# Patient Record
Sex: FEMALE | Race: WHITE | NOT HISPANIC OR LATINO | Employment: FULL TIME | URBAN - METROPOLITAN AREA
[De-identification: names, ages, dates, MRNs, and addresses within clinical notes are randomized per-mention and may not be internally consistent; named-entity substitution may affect disease eponyms.]

---

## 2024-02-27 NOTE — PROGRESS NOTES
Assessment/Plan:  Calcium 1000 mg + 600-1000 IU Vit D daily. Pap with high risk HPV Q 3 years,  Annual mammogram, monthly breast self exam.        Diagnoses and all orders for this visit:    Encounter for gynecological examination without abnormal finding    Encounter for screening mammogram for breast cancer  -     Mammo screening bilateral w 3d & cad; Future    Encounter for Papanicolaou smear for cervical cancer screening  -     IGP,CtNg,AptimaHPV,rfx16/18,45    Screen for STD (sexually transmitted disease)  -     Hepatitis C antibody; Future  -     HIV 1/2 AG/AB W REFLEX [facility] only; Future  -     RPR, Rfx Qn RPR/Confirm TP; Future  -     Hepatitis C antibody  -     HIV 1/2 AG/AB W REFLEX [facility] only  -     RPR, Rfx Qn RPR/Confirm TP  -     IGP,CtNg,AptimaHPV,rfx16/18,45    Other orders  -     LORazepam (ATIVAN) 0.5 mg tablet; TAKE 1/2 -1 TABLET BY MOUTH ONCE A DAY AS NEEDED FOR ANXIETY          Subjective:      Patient ID: Kait Rice is a 43 y.o. female.    New/former pt here for annual gyn  H/o abn pap 2017 ASCUS + HPV [place] neg 2018 LGSIL colpneg. PAP  neg/neg  neg/neg Out of relationship would like STD testing LMP Oct had spotting in Dec periods past year spacing out every 2-3 months + hot flashes night sweats Reports mom early menopause but not sure how early Has appt with primary for screening labs Has not yet had mammo         The following portions of the patient's history were reviewed and updated as appropriate: allergies, current medications, past family history, past medical history, past social history, past surgical history, and problem list.    Review of Systems   Constitutional:  Negative for fatigue and unexpected weight change.   Gastrointestinal:  Negative for abdominal distention, abdominal pain, constipation and diarrhea.   Genitourinary:  Negative for difficulty urinating, dyspareunia, dysuria, frequency, genital sores, menstrual problem, pelvic  "pain, urgency, vaginal bleeding, vaginal discharge and vaginal pain.   Neurological:  Negative for headaches.   Psychiatric/Behavioral: Negative.  Negative for dysphoric mood. The patient is not nervous/anxious.          Objective:      /78 (BP Location: Right arm, Patient Position: Sitting, Cuff Size: Standard)   Ht 5' 4\" (1.626 m)   Wt 76.2 kg (168 lb)   BMI 28.84 kg/m²          Physical Exam  Vitals and nursing note reviewed.   Constitutional:       General: She is not in acute distress.     Appearance: Normal appearance.   HENT:      Head: Normocephalic and atraumatic.   Pulmonary:      Effort: Pulmonary effort is normal.   Chest:   Breasts:     Breasts are symmetrical.      Right: Normal. No mass, nipple discharge, skin change or tenderness.      Left: Normal. No mass, nipple discharge, skin change or tenderness.   Abdominal:      General: There is no distension.      Palpations: Abdomen is soft.      Tenderness: There is no abdominal tenderness. There is no guarding or rebound.   Genitourinary:     General: Normal vulva.      Exam position: Lithotomy position.      Labia:         Right: No rash, tenderness, lesion or injury.         Left: No rash, tenderness, lesion or injury.       Urethra: No prolapse, urethral pain, urethral swelling or urethral lesion.      Vagina: Normal. No erythema or lesions.      Cervix: No cervical motion tenderness, discharge, lesion or cervical bleeding.      Uterus: Normal.       Adnexa: Right adnexa normal and left adnexa normal.        Right: No mass or tenderness.          Left: No mass or tenderness.        Rectum: No mass or external hemorrhoid.      Comments: PAP from cervix  Musculoskeletal:         General: Normal range of motion.   Lymphadenopathy:      Upper Body:      Right upper body: No axillary adenopathy.      Left upper body: No axillary adenopathy.      Lower Body: No right inguinal adenopathy. No left inguinal adenopathy.   Skin:     General: Skin is " warm and dry.   Neurological:      Mental Status: She is alert and oriented to person, place, and time.   Psychiatric:         Mood and Affect: Mood normal.         Behavior: Behavior normal.         Thought Content: Thought content normal.         Judgment: Judgment normal.

## 2024-02-28 ENCOUNTER — OFFICE VISIT (OUTPATIENT)
Dept: OBGYN CLINIC | Facility: CLINIC | Age: 44
End: 2024-02-28
Payer: COMMERCIAL

## 2024-02-28 VITALS
HEIGHT: 64 IN | SYSTOLIC BLOOD PRESSURE: 120 MMHG | BODY MASS INDEX: 28.68 KG/M2 | DIASTOLIC BLOOD PRESSURE: 78 MMHG | WEIGHT: 168 LBS

## 2024-02-28 DIAGNOSIS — Z11.3 SCREEN FOR STD (SEXUALLY TRANSMITTED DISEASE): ICD-10-CM

## 2024-02-28 DIAGNOSIS — Z01.419 ENCOUNTER FOR GYNECOLOGICAL EXAMINATION WITHOUT ABNORMAL FINDING: Primary | ICD-10-CM

## 2024-02-28 DIAGNOSIS — Z12.4 ENCOUNTER FOR PAPANICOLAOU SMEAR FOR CERVICAL CANCER SCREENING: ICD-10-CM

## 2024-02-28 DIAGNOSIS — Z12.31 ENCOUNTER FOR SCREENING MAMMOGRAM FOR BREAST CANCER: ICD-10-CM

## 2024-02-28 PROCEDURE — S0610 ANNUAL GYNECOLOGICAL EXAMINA: HCPCS | Performed by: NURSE PRACTITIONER

## 2024-02-28 RX ORDER — LORAZEPAM 0.5 MG/1
TABLET ORAL
COMMUNITY
Start: 2024-02-22

## 2024-02-28 NOTE — PATIENT INSTRUCTIONS
Calcium 1000 mg + 600-1000 IU Vit D daily. Pap with high risk HPV Q 3 years,  Annual mammogram, monthly breast self exam.

## 2024-03-05 LAB
C TRACH RRNA CVX QL NAA+PROBE: NEGATIVE
CYTOLOGIST CVX/VAG CYTO: ABNORMAL
DX ICD CODE: ABNORMAL
HPV GENOTYPE REFLEX: ABNORMAL
HPV I/H RISK 4 DNA CVX QL PROBE+SIG AMP: POSITIVE
HPV16 DNA CVX QL PROBE+SIG AMP: NEGATIVE
HPV18+45 E6+E7 MRNA CVX QL NAA+PROBE: NEGATIVE
N GONORRHOEA RRNA CVX QL NAA+PROBE: NEGATIVE
OTHER STN SPEC: ABNORMAL
PATH REPORT.FINAL DX SPEC: ABNORMAL
SL AMB NOTE:: ABNORMAL
SL AMB SPECIMEN ADEQUACY: ABNORMAL
SL AMB TEST METHODOLOGY: ABNORMAL

## 2025-05-11 NOTE — PROGRESS NOTES
Assessment/Plan:  Calcium 1000 mg + 600-1000 IU Vit D daily.   Pap with high risk HPV next pap 1 year  mammogram, monthly breast self exam.  Exercise 150 minutes per week minimum.         Diagnoses and all orders for this visit:    Encounter for gynecological examination without abnormal finding    Encounter for screening mammogram for breast cancer  -     Mammo screening bilateral w 3d and cad; Future    Encounter for Papanicolaou smear for cervical cancer screening  -     IGP,CtNg,AptimaHPV,rfx16/18,45    HPV (human papilloma virus) infection  -     IGP,CtNg,AptimaHPV,rfx16/18,45    Screen for STD (sexually transmitted disease)  -     IGP,CtNg,AptimaHPV,rfx16/18,45    Other orders  -     Liquid-based pap, screening          Subjective:      Patient ID: Kait Rice is a 44 y.o. female.     here for annual gyn  Daughter is 9 H/o abn pap 2017 ASCUS + HPV [place] neg 2018 LGSIL colpneg. PAP  neg/neg  neg/neg 2024 neg + other HpV for repeat today SA new partner LMP Oct 2023 Reports mom early menopause but not sure how early Has appt with primary for screening labs Has not yet had mammo         The following portions of the patient's history were reviewed and updated as appropriate: allergies, current medications, past family history, past medical history, past social history, past surgical history, and problem list.    Review of Systems   Constitutional:  Negative for fatigue and unexpected weight change.   Gastrointestinal:  Negative for abdominal distention, abdominal pain, constipation and diarrhea.   Genitourinary:  Negative for difficulty urinating, dyspareunia, dysuria, frequency, genital sores, menstrual problem, pelvic pain, urgency, vaginal bleeding, vaginal discharge and vaginal pain.   Neurological:  Negative for headaches.   Psychiatric/Behavioral: Negative.  Negative for dysphoric mood. The patient is not nervous/anxious.          Objective:      /78 (BP Location: Right arm,  "Patient Position: Sitting, Cuff Size: Standard)   Ht 5' 4\" (1.626 m)   Wt 80.7 kg (178 lb)   BMI 30.55 kg/m²          Physical Exam  Vitals and nursing note reviewed.   Constitutional:       General: She is not in acute distress.     Appearance: Normal appearance.   HENT:      Head: Normocephalic and atraumatic.   Pulmonary:      Effort: Pulmonary effort is normal.   Chest:   Breasts:     Breasts are symmetrical.      Right: Normal. No mass, nipple discharge, skin change or tenderness.      Left: Normal. No mass, nipple discharge, skin change or tenderness.   Abdominal:      General: There is no distension.      Palpations: Abdomen is soft.      Tenderness: There is no abdominal tenderness. There is no guarding or rebound.   Genitourinary:     General: Normal vulva.      Exam position: Lithotomy position.      Labia:         Right: No rash, tenderness, lesion or injury.         Left: No rash, tenderness, lesion or injury.       Urethra: No prolapse, urethral pain, urethral swelling or urethral lesion.      Vagina: Normal. No erythema or lesions.      Cervix: No cervical motion tenderness, discharge, lesion or cervical bleeding.      Uterus: Normal.       Adnexa: Right adnexa normal and left adnexa normal.        Right: No mass or tenderness.          Left: No mass or tenderness.        Rectum: No mass or external hemorrhoid.      Comments: PAP from cervix   Musculoskeletal:         General: Normal range of motion.   Lymphadenopathy:      Upper Body:      Right upper body: No axillary adenopathy.      Left upper body: No axillary adenopathy.      Lower Body: No right inguinal adenopathy. No left inguinal adenopathy.   Skin:     General: Skin is warm and dry.   Neurological:      Mental Status: She is alert and oriented to person, place, and time.   Psychiatric:         Mood and Affect: Mood normal.         Behavior: Behavior normal.         Thought Content: Thought content normal.         Judgment: Judgment " normal.

## 2025-05-12 ENCOUNTER — ANNUAL EXAM (OUTPATIENT)
Dept: OBGYN CLINIC | Facility: CLINIC | Age: 45
End: 2025-05-12
Payer: COMMERCIAL

## 2025-05-12 VITALS
SYSTOLIC BLOOD PRESSURE: 120 MMHG | BODY MASS INDEX: 30.39 KG/M2 | DIASTOLIC BLOOD PRESSURE: 78 MMHG | WEIGHT: 178 LBS | HEIGHT: 64 IN

## 2025-05-12 DIAGNOSIS — Z12.4 ENCOUNTER FOR PAPANICOLAOU SMEAR FOR CERVICAL CANCER SCREENING: ICD-10-CM

## 2025-05-12 DIAGNOSIS — Z01.419 ENCOUNTER FOR GYNECOLOGICAL EXAMINATION WITHOUT ABNORMAL FINDING: Primary | ICD-10-CM

## 2025-05-12 DIAGNOSIS — Z11.3 SCREEN FOR STD (SEXUALLY TRANSMITTED DISEASE): ICD-10-CM

## 2025-05-12 DIAGNOSIS — Z12.31 ENCOUNTER FOR SCREENING MAMMOGRAM FOR BREAST CANCER: ICD-10-CM

## 2025-05-12 DIAGNOSIS — B97.7 HPV (HUMAN PAPILLOMA VIRUS) INFECTION: ICD-10-CM

## 2025-05-12 PROCEDURE — S0612 ANNUAL GYNECOLOGICAL EXAMINA: HCPCS | Performed by: NURSE PRACTITIONER

## 2025-05-12 NOTE — PATIENT INSTRUCTIONS
Calcium 1000 mg + 600-1000 IU Vit D daily.   Pap with high risk HPV next pap 1 year  mammogram, monthly breast self exam.  Exercise 150 minutes per week minimum.

## 2025-05-15 ENCOUNTER — RESULTS FOLLOW-UP (OUTPATIENT)
Dept: OBGYN CLINIC | Facility: CLINIC | Age: 45
End: 2025-05-15

## 2025-05-15 LAB
C TRACH RRNA CVX QL NAA+PROBE: NEGATIVE
CYTOLOGIST CVX/VAG CYTO: NORMAL
DX ICD CODE: NORMAL
HPV GENOTYPE REFLEX: NORMAL
HPV I/H RISK 4 DNA CVX QL PROBE+SIG AMP: NEGATIVE
Lab: NORMAL
N GONORRHOEA RRNA CVX QL NAA+PROBE: NEGATIVE
OTHER STN SPEC: NORMAL
PATH REPORT.FINAL DX SPEC: NORMAL
SL AMB NOTE:: NORMAL
SL AMB SPECIMEN ADEQUACY: NORMAL
SL AMB TEST METHODOLOGY: NORMAL